# Patient Record
Sex: MALE | Race: BLACK OR AFRICAN AMERICAN | NOT HISPANIC OR LATINO | Employment: OTHER | URBAN - METROPOLITAN AREA
[De-identification: names, ages, dates, MRNs, and addresses within clinical notes are randomized per-mention and may not be internally consistent; named-entity substitution may affect disease eponyms.]

---

## 2017-01-04 ENCOUNTER — HOSPITAL ENCOUNTER (OUTPATIENT)
Dept: RADIOLOGY | Facility: HOSPITAL | Age: 57
Discharge: HOME | End: 2017-01-04
Attending: SPECIALIST
Payer: MEDICARE

## 2017-01-04 VITALS
WEIGHT: 200 LBS | OXYGEN SATURATION: 98 % | BODY MASS INDEX: 28.63 KG/M2 | DIASTOLIC BLOOD PRESSURE: 68 MMHG | HEIGHT: 70 IN | HEART RATE: 68 BPM | RESPIRATION RATE: 16 BRPM | SYSTOLIC BLOOD PRESSURE: 143 MMHG | TEMPERATURE: 99 F

## 2017-01-04 DIAGNOSIS — M54.16 LUMBAR RADICULOPATHY: ICD-10-CM

## 2017-01-04 DIAGNOSIS — M47.26 OSTEOARTHRITIS OF SPINE WITH RADICULOPATHY, LUMBAR REGION: Primary | ICD-10-CM

## 2017-01-04 PROCEDURE — 72131 CT LUMBAR SPINE W/O DYE: CPT | Mod: 26,,, | Performed by: RADIOLOGY

## 2017-01-04 PROCEDURE — 62304 MYELOGRAPHY LUMBAR INJECTION: CPT | Mod: ,,, | Performed by: RADIOLOGY

## 2017-01-04 PROCEDURE — 62304 MYELOGRAPHY LUMBAR INJECTION: CPT | Mod: TC

## 2017-01-04 PROCEDURE — 72131 CT LUMBAR SPINE W/O DYE: CPT | Mod: TC

## 2017-01-04 PROCEDURE — 63600175 PHARM REV CODE 636 W HCPCS

## 2017-01-04 PROCEDURE — 25500020 PHARM REV CODE 255: Performed by: SPECIALIST

## 2017-01-04 RX ORDER — IOPAMIDOL 408 MG/ML
15 INJECTION, SOLUTION INTRATHECAL
Status: COMPLETED | OUTPATIENT
Start: 2017-01-04 | End: 2017-01-04

## 2017-01-04 RX ORDER — IOPAMIDOL 408 MG/ML
INJECTION, SOLUTION INTRATHECAL
Status: DISPENSED
Start: 2017-01-04 | End: 2017-01-04

## 2017-01-04 RX ORDER — MORPHINE SULFATE 10 MG/ML
INJECTION INTRAMUSCULAR; INTRAVENOUS; SUBCUTANEOUS
Status: COMPLETED
Start: 2017-01-04 | End: 2017-01-04

## 2017-01-04 RX ORDER — HYDROCODONE BITARTRATE AND ACETAMINOPHEN 5; 325 MG/1; MG/1
2 TABLET ORAL EVERY 4 HOURS PRN
Status: DISCONTINUED | OUTPATIENT
Start: 2017-01-04 | End: 2017-01-05 | Stop reason: HOSPADM

## 2017-01-04 RX ORDER — MORPHINE SULFATE 10 MG/ML
10 INJECTION INTRAMUSCULAR; INTRAVENOUS; SUBCUTANEOUS ONCE
Status: COMPLETED | OUTPATIENT
Start: 2017-01-04 | End: 2017-01-04

## 2017-01-04 RX ADMIN — IOPAMIDOL 15 ML: 408 INJECTION, SOLUTION INTRATHECAL at 11:01

## 2017-01-04 RX ADMIN — MORPHINE SULFATE 10 MG: 10 INJECTION INTRAMUSCULAR; INTRAVENOUS; SUBCUTANEOUS at 11:01

## 2017-01-04 NOTE — DISCHARGE INSTRUCTIONS
"Discharge Instructions: After Your Surgery/Procedure  Youve just had surgery. During surgery you were given medicine called anesthesia to keep you relaxed and free of pain. After surgery you may have some pain or nausea. This is common. Here are some tips for feeling better and getting well after surgery.     Stay on schedule with your medication.   Going home  Your doctor or nurse will show you how to take care of yourself when you go home. He or she will also answer your questions. Have an adult family member or friend drive you home.      For your safety we recommend these precaution for the first 24 hours after your procedure:  · Do not drive or use heavy equipment.  · Do not make important decisions or sign legal papers.  · Do not drink alcohol.  · Have someone stay with you, if needed. He or she can watch for problems and help keep you safe.  · Your concentration, balance, coordination, and judgement may be impaired for many hours after anesthesia.  Use caution when ambulating or standing up.     · You may feel weak and "washed out" after anesthesia and surgery.      Subtle residual effects of general anesthesia or sedation with regional / local anesthesia can last more than 24 hours.  Rest for the remainder of the day or longer if your Doctor/Surgeon has advised you to do so.  Although you may feel normal within the first 24 hours, your reflexes and mental ability may be impaired without you realizing it.  You may feel dizzy, lightheaded or sleepy for 24 hours or longer.      Be sure to go to all follow-up visits with your doctor. And rest after your surgery for as long as your doctor tells you to.  Coping with pain  If you have pain after surgery, pain medicine will help you feel better. Take it as told, before pain becomes severe. Also, ask your doctor or pharmacist about other ways to control pain. This might be with heat, ice, or relaxation. And follow any other instructions your surgeon or nurse gives " you.  Tips for taking pain medicine  To get the best relief possible, remember these points:  · Pain medicines can upset your stomach. Taking them with a little food may help.  · Most pain relievers taken by mouth need at least 20 to 30 minutes to start to work.  · Taking medicine on a schedule can help you remember to take it. Try to time your medicine so that you can take it before starting an activity. This might be before you get dressed, go for a walk, or sit down for dinner.  · Constipation is a common side effect of pain medicines. Call your doctor before taking any medicines such as laxatives or stool softeners to help ease constipation. Also ask if you should skip any foods. Drinking lots of fluids and eating foods such as fruits and vegetables that are high in fiber can also help. Remember, do not take laxatives unless your surgeon has prescribed them.  · Drinking alcohol and taking pain medicine can cause dizziness and slow your breathing. It can even be deadly. Do not drink alcohol while taking pain medicine.  · Pain medicine can make you react more slowly to things. Do not drive or run machinery while taking pain medicine.  Your health care provider may tell you to take acetaminophen to help ease your pain. Ask him or her how much you are supposed to take each day. Acetaminophen or other pain relievers may interact with your prescription medicines or other over-the-counter (OTC) drugs. Some prescription medicines have acetaminophen and other ingredients. Using both prescription and OTC acetaminophen for pain can cause you to overdose. Read the labels on your OTC medicines with care. This will help you to clearly know the list of ingredients, how much to take, and any warnings. It may also help you not take too much acetaminophen. If you have questions or do not understand the information, ask your pharmacist or health care provider to explain it to you before you take the OTC medicine.  Managing  nausea  Some people have an upset stomach after surgery. This is often because of anesthesia, pain, or pain medicine, or the stress of surgery. These tips will help you handle nausea and eat healthy foods as you get better. If you were on a special food plan before surgery, ask your doctor if you should follow it while you get better. These tips may help:  · Do not push yourself to eat. Your body will tell you when to eat and how much.  · Start off with clear liquids and soup. They are easier to digest.  · Next try semi-solid foods, such as mashed potatoes, applesauce, and gelatin, as you feel ready.  · Slowly move to solid foods. Dont eat fatty, rich, or spicy foods at first.  · Do not force yourself to have 3 large meals a day. Instead eat smaller amounts more often.  · Take pain medicines with a small amount of solid food, such as crackers or toast, to avoid nausea.     Call your surgeon if  · You still have pain an hour after taking medicine. The medicine may not be strong enough.  · You feel too sleepy, dizzy, or groggy. The medicine may be too strong.  · You have side effects like nausea, vomiting, or skin changes, such as rash, itching, or hives.       If you have obstructive sleep apnea  You were given anesthesia medicine during surgery to keep you comfortable and free of pain. After surgery, you may have more apnea spells because of this medicine and other medicines you were given. The spells may last longer than usual.   At home:  · Keep using the continuous positive airway pressure (CPAP) device when you sleep. Unless your health care provider tells you not to, use it when you sleep, day or night. CPAP is a common device used to treat obstructive sleep apnea.  · Talk with your provider before taking any pain medicine, muscle relaxants, or sedatives. Your provider will tell you about the possible dangers of taking these medicines.  © 4757-0413 [employer]. [address] [place] [address]  [place] [address]. All rights reserved. This information is not intended as a substitute for professional medical care. Always follow your healthcare professional's instructions.      Myelogram  A myelogram is a test to check problems with your spinal canal. The canal is a tunnel-like structure in your spine that holds your spinal cord. A myelogram uses X-ray or computed tomography (CT) to take pictures of your spinal canal.  How do I get ready for a myelogram?  · Dont eat the morning of the test. But you can drink water or other clear fluids.  · If told to, stop taking medicines before the test.  · Arrange for someone to drive you home.  Tell the health care provider  Tell the health care provider if you:  · Are pregnant or think you may be  · Have any bleeding problems  · Take blood thinners (anticoagulants) or other medicines. These include aspirin, certain antipsychotic medicines, and antidepressants. You may be told to stop taking these 1 or more days before your test.   · Have had back surgery or low-back pain  · Have any allergies   What happens during a myelogram?     The exam table may be tilted during the X-ray.    · You will change into a hospital gown.  · X-rays of your spine will be taken.  · Your lower back will be cleaned, covered with drapes, and injected with a numbing medicine.  · Your doctor will advance a thin needle under guidance, into your spinal canal space.  · Your doctor will inject contrast fluid into your spinal canal. The doctor may take out a small amount of spinal fluid.  · Additional X-rays will be taken.  · If you need a CT test, it will follow the X-rays.  What happens after a myelogram?  · Take it easy for the rest of the day, as advised.  · Avoid physical activity, or bending over for 1 to 2 days after the procedure, or as directed by your health care provider.  · Lie down with your head raised if you get a headache, or if instructed to do so.  · Drink plenty of water.  · Your provider  will discuss the test results with you at a follow-up appointment.  What are the risks of a myelogram?  · Small risks of pain, bleeding or infection at the injection site or within or around the spinal canal  · Headache  · Injury to a nerve or the spinal cord at the injection site  · X-ray radiation exposure (generally considered to be low risk and safe)  When should I call my health care provider?  Call your health care provider right away if:  · You have a headache that lasts 2 days or more  · Fever (1°F above your normal temperature) lasting for 24 to 48 hours  · You have lasting pain in your back, or tingling in your groin or legs  · Or, whatever your health care provider told you to report based on your medical condition   © 3915-8008 [employer]. [address] [place] [address], [place] [address]. All rights reserved. This information is not intended as a substitute for professional medical care. Always follow your healthcare professional's instructions.

## 2017-01-04 NOTE — PLAN OF CARE
Pt in phase II rec. Has small bandaid on lumbar area on  Back. SO at bedside. Instructed both pt and SO that pt will be here awhile, they can watch TV  To pass time. Ordered sandwich tray for pt. Pt is drinking coke at present nad, states pain is much better since getting pain med downstairs.  1300- pt eating sandwich tray, grateful for lunch  1330- pt urinated without diff. Small bandaid on lower back dry and intact. SO at bedside, explained dc instructions to pt and SO both verb understanding. Wheeled to car via wheelchair by alexa whitman

## 2017-01-04 NOTE — NURSING
Patient had pain level 8/10 prior to procedure, Dr. Walsh notified verbal orders given for morphine sulfate 10 mg IV for one dose for pain control, patient reports decrease in pain after morphine given pain level decreased to 4/10 per patient. AR

## 2017-01-04 NOTE — INTERVAL H&P NOTE
The patient has been examined and the H&P has been reviewed:    I concur with the findings and no changes have occurred since H&P was written.Note is in  from Dr. Flores. No prior problems with anaesthesia but pt on time released morphine at home, has not had for two days. Will give Morphine IV before lumbar myelogram and ct scan.         There are no hospital problems to display for this patient.

## 2017-01-04 NOTE — NURSING
Procedure complete, bandaid to lumbar spine, pt tolerated well with pain controlled with Morphine 10 mg iv once. Report called to Moo SYKES verbalized understanding. AR

## 2017-01-04 NOTE — IP AVS SNAPSHOT
[address] [place] [address]  Phone: [phone]           I have received a copy of my After Visit Summary and discharge instructions from [facility].    INSTRUCTIONS RECEIVED AND UNDERSTOOD BY:                     Patient/Patient Representative: ________________________________________________________________     Date/Time: ________________________________________________________________                     Instructions Given By: ________________________________________________________________     Date/Time: ________________________________________________________________

## 2017-01-04 NOTE — OR NURSING
Pt arrived to preop and placed in bed. Warm blankets provided. Periop process explained to both patient and significant other with verbalized understanding. Pts belongings tagged/bagged and kept in preop.  Pts money, cell phone, and wallet given to Jaci Kee.

## 2017-01-04 NOTE — IP AVS SNAPSHOT
[address] [place] [address]  Phone: [phone]           Patient Discharge Instructions     Our goal is to set you up for success. This packet includes information on your condition, medications, and your home care. It will help you to care for yourself so you don't get sicker and need to go back to the hospital.     Please ask your nurse if you have any questions.        There are many details to remember when preparing to leave the hospital. Here is what you will need to do:    1. Take your medicine. If you are prescribed medications, review your Medication List in the following pages. You may have new medications to  at the pharmacy and others that you'll need to stop taking. Review the instructions for how and when to take your medications. Talk with your doctor or nurses if you are unsure of what to do.     2. Go to your follow-up appointments. Specific follow-up information is listed in the following pages. Your may be contacted by a transition nurse or clinical provider about future appointments. Be sure we have all of the phone numbers to reach you, if needed. Please contact your provider's office if you are unable to make an appointment.     3. Watch for warning signs. Your doctor or nurse will give you detailed warning signs to watch for and when to call for assistance. These instructions may also include educational information about your condition. If you experience any of warning signs to your health, call your doctor.               ** Verify the list of medication(s) below is accurate and up to date. Carry this with you in case of emergency. If your medications have changed, please notify your healthcare provider.             Medication List      TAKE these medications        Additional Info                      hydrocodone-acetaminophen 10-325mg  mg Tab   Commonly known as:  NORCO   Refills:  0    Instructions:  Take by mouth as needed  for Pain.     Begin Date    AM    Noon    PM    Bedtime       morphine 30 MG 24 hr capsule   Commonly known as:  AVINZA   Refills:  0   Dose:  30 mg    Instructions:  Take 30 mg by mouth once daily.     Begin Date    AM    Noon    PM    Bedtime       NAPROSYN ORAL   Refills:  0    Instructions:  Take by mouth.     Begin Date    AM    Noon    PM    Bedtime       SOMA ORAL   Refills:  0    Instructions:  Take by mouth as needed.     Begin Date    AM    Noon    PM    Bedtime       tramadol 50 mg tablet   Commonly known as:  ULTRAM   Refills:  0   Dose:  50 mg    Instructions:  Take 50 mg by mouth every 6 (six) hours as needed for Pain.     Begin Date    AM    Noon    PM    Bedtime                  Please bring to all follow up appointments:    1. A copy of your discharge instructions.  2. All medicines you are currently taking in their original bottles.  3. Identification and insurance card.    Please arrive 15 minutes ahead of scheduled appointment time.    Please call 24 hours in advance if you must reschedule your appointment and/or time.            Discharge Instructions       Discharge Instructions: After Your Surgery/Procedure  Youve just had surgery. During surgery you were given medicine called anesthesia to keep you relaxed and free of pain. After surgery you may have some pain or nausea. This is common. Here are some tips for feeling better and getting well after surgery.     Stay on schedule with your medication.   Going home  Your doctor or nurse will show you how to take care of yourself when you go home. He or she will also answer your questions. Have an adult family member or friend drive you home.      For your safety we recommend these precaution for the first 24 hours after your procedure:  · Do not drive or use heavy equipment.  · Do not make important decisions or sign legal papers.  · Do not drink alcohol.  · Have someone stay with you, if needed. He or she can watch for problems and help keep you  "safe.  · Your concentration, balance, coordination, and judgement may be impaired for many hours after anesthesia.  Use caution when ambulating or standing up.     · You may feel weak and "washed out" after anesthesia and surgery.      Subtle residual effects of general anesthesia or sedation with regional / local anesthesia can last more than 24 hours.  Rest for the remainder of the day or longer if your Doctor/Surgeon has advised you to do so.  Although you may feel normal within the first 24 hours, your reflexes and mental ability may be impaired without you realizing it.  You may feel dizzy, lightheaded or sleepy for 24 hours or longer.      Be sure to go to all follow-up visits with your doctor. And rest after your surgery for as long as your doctor tells you to.  Coping with pain  If you have pain after surgery, pain medicine will help you feel better. Take it as told, before pain becomes severe. Also, ask your doctor or pharmacist about other ways to control pain. This might be with heat, ice, or relaxation. And follow any other instructions your surgeon or nurse gives you.  Tips for taking pain medicine  To get the best relief possible, remember these points:  · Pain medicines can upset your stomach. Taking them with a little food may help.  · Most pain relievers taken by mouth need at least 20 to 30 minutes to start to work.  · Taking medicine on a schedule can help you remember to take it. Try to time your medicine so that you can take it before starting an activity. This might be before you get dressed, go for a walk, or sit down for dinner.  · Constipation is a common side effect of pain medicines. Call your doctor before taking any medicines such as laxatives or stool softeners to help ease constipation. Also ask if you should skip any foods. Drinking lots of fluids and eating foods such as fruits and vegetables that are high in fiber can also help. Remember, do not take laxatives unless your surgeon " has prescribed them.  · Drinking alcohol and taking pain medicine can cause dizziness and slow your breathing. It can even be deadly. Do not drink alcohol while taking pain medicine.  · Pain medicine can make you react more slowly to things. Do not drive or run machinery while taking pain medicine.  Your health care provider may tell you to take acetaminophen to help ease your pain. Ask him or her how much you are supposed to take each day. Acetaminophen or other pain relievers may interact with your prescription medicines or other over-the-counter (OTC) drugs. Some prescription medicines have acetaminophen and other ingredients. Using both prescription and OTC acetaminophen for pain can cause you to overdose. Read the labels on your OTC medicines with care. This will help you to clearly know the list of ingredients, how much to take, and any warnings. It may also help you not take too much acetaminophen. If you have questions or do not understand the information, ask your pharmacist or health care provider to explain it to you before you take the OTC medicine.  Managing nausea  Some people have an upset stomach after surgery. This is often because of anesthesia, pain, or pain medicine, or the stress of surgery. These tips will help you handle nausea and eat healthy foods as you get better. If you were on a special food plan before surgery, ask your doctor if you should follow it while you get better. These tips may help:  · Do not push yourself to eat. Your body will tell you when to eat and how much.  · Start off with clear liquids and soup. They are easier to digest.  · Next try semi-solid foods, such as mashed potatoes, applesauce, and gelatin, as you feel ready.  · Slowly move to solid foods. Dont eat fatty, rich, or spicy foods at first.  · Do not force yourself to have 3 large meals a day. Instead eat smaller amounts more often.  · Take pain medicines with a small amount of solid food, such as crackers or  toast, to avoid nausea.     Call your surgeon if  · You still have pain an hour after taking medicine. The medicine may not be strong enough.  · You feel too sleepy, dizzy, or groggy. The medicine may be too strong.  · You have side effects like nausea, vomiting, or skin changes, such as rash, itching, or hives.       If you have obstructive sleep apnea  You were given anesthesia medicine during surgery to keep you comfortable and free of pain. After surgery, you may have more apnea spells because of this medicine and other medicines you were given. The spells may last longer than usual.   At home:  · Keep using the continuous positive airway pressure (CPAP) device when you sleep. Unless your health care provider tells you not to, use it when you sleep, day or night. CPAP is a common device used to treat obstructive sleep apnea.  · Talk with your provider before taking any pain medicine, muscle relaxants, or sedatives. Your provider will tell you about the possible dangers of taking these medicines.  © 5617-5754 [employer]. [address] [place] [address]. All rights reserved. This information is not intended as a substitute for professional medical care. Always follow your healthcare professional's instructions.      Myelogram  A myelogram is a test to check problems with your spinal canal. The canal is a tunnel-like structure in your spine that holds your spinal cord. A myelogram uses X-ray or computed tomography (CT) to take pictures of your spinal canal.  How do I get ready for a myelogram?  · Dont eat the morning of the test. But you can drink water or other clear fluids.  · If told to, stop taking medicines before the test.  · Arrange for someone to drive you home.  Tell the health care provider  Tell the health care provider if you:  · Are pregnant or think you may be  · Have any bleeding problems  · Take blood thinners (anticoagulants) or other medicines. These include aspirin,  certain antipsychotic medicines, and antidepressants. You may be told to stop taking these 1 or more days before your test.   · Have had back surgery or low-back pain  · Have any allergies   What happens during a myelogram?     The exam table may be tilted during the X-ray.    · You will change into a hospital gown.  · X-rays of your spine will be taken.  · Your lower back will be cleaned, covered with drapes, and injected with a numbing medicine.  · Your doctor will advance a thin needle under guidance, into your spinal canal space.  · Your doctor will inject contrast fluid into your spinal canal. The doctor may take out a small amount of spinal fluid.  · Additional X-rays will be taken.  · If you need a CT test, it will follow the X-rays.  What happens after a myelogram?  · Take it easy for the rest of the day, as advised.  · Avoid physical activity, or bending over for 1 to 2 days after the procedure, or as directed by your health care provider.  · Lie down with your head raised if you get a headache, or if instructed to do so.  · Drink plenty of water.  · Your provider will discuss the test results with you at a follow-up appointment.  What are the risks of a myelogram?  · Small risks of pain, bleeding or infection at the injection site or within or around the spinal canal  · Headache  · Injury to a nerve or the spinal cord at the injection site  · X-ray radiation exposure (generally considered to be low risk and safe)  When should I call my health care provider?  Call your health care provider right away if:  · You have a headache that lasts 2 days or more  · Fever (1°F above your normal temperature) lasting for 24 to 48 hours  · You have lasting pain in your back, or tingling in your groin or legs  · Or, whatever your health care provider told you to report based on your medical condition   © 2561-1524 [employer]. [address] [place] [address], [place] [address]. All rights reserved. This information  "is not intended as a substitute for professional medical care. Always follow your healthcare professional's instructions.            Admission Information     Date & Time Provider Department CSN    1/4/2017 11:00 AM Geovany Flores Jr., MD [facility] [id number]      Care Providers     Provider Role Specialty Primary office phone    Geovany Flores Jr., MD Attending Provider Orthopedic Surgery [phone]      Your Vitals Were     BP Pulse Temp Resp Height Weight    144/94 67 98.3 °F (36.8 °C) 18 5' 10" (1.778 m) 90.7 kg (200 lb)    SpO2 BMI             99% 28.7 kg/m2         Recent Lab Values     No lab values to display.      Allergies as of 1/4/2017     No Known Allergies      [facility]     [facility] Nurse Care Line - 24/7 Assistance  Unless otherwise directed by your provider, please contact [facility], our nurse care line that is available for 24/7 assistance.     Registered nurses in the [facility] provide clinical advisement, health education, appointment booking, and other advisory services.  Call for this free service at [phone].        Advance Directives     An advance directive is a document which, in the event you are no longer able to make decisions for yourself, tells your healthcare team what kind of treatment you do or do not want to receive, or who you would like to make those decisions for you.  If you do not currently have an advance directive, [facility] encourages you to create one.  For more information call:  [phone]), [phone] ([phone]),  or log on to [email or web address].        Language Assistance Services     ATTENTION: Language assistance services are available, free of charge. Please call [phone].      ATENCIÓN: Si habla español, tiene a alberto disposición servicios gratuitos de asistencia lingüística. Llame al [phone].     CHÚ Ý: N?u b?n nói Ti?ng Vi?t, có các d?ch v? h? tr? ngôn ng? mi?n " adeel aminah cho b?n. G?i s? [phone].        [facility] Sign-Up     Activating your [facility] account is as easy as 1-2-3!     1) Visit [email or web address], select Sign Up Now, enter this activation code and your date of birth, then select Next.  [email or web address]  Expires: 2/11/2017  3:52 PM      2) Create a username and password to use when you visit [facility] in the future and select a security question in case you lose your password and select Next.    3) Enter your e-mail address and click Sign Up!    Additional Information  If you have questions, please e-mail [email or web address] or call [phone] to talk to our [facility] staff. Remember, [facility] is NOT to be used for urgent needs. For medical emergencies, dial 911.          [facility] complies with applicable Federal civil rights laws and does not discriminate on the basis of race, color, national origin, age, disability, or sex.

## 2017-07-07 PROBLEM — Z98.1 STATUS POST CERVICAL SPINAL FUSION: Status: ACTIVE | Noted: 2017-07-07

## 2017-09-14 ENCOUNTER — ANESTHESIA EVENT (OUTPATIENT)
Dept: SURGERY | Facility: AMBULARY SURGERY CENTER | Age: 57
End: 2017-09-14
Payer: MEDICARE

## 2017-09-15 ENCOUNTER — HOSPITAL ENCOUNTER (OUTPATIENT)
Facility: AMBULARY SURGERY CENTER | Age: 57
Discharge: HOME | End: 2017-09-15
Attending: SPECIALIST | Admitting: SPECIALIST
Payer: MEDICARE

## 2017-09-15 ENCOUNTER — ANESTHESIA (OUTPATIENT)
Dept: SURGERY | Facility: AMBULARY SURGERY CENTER | Age: 57
End: 2017-09-15
Payer: MEDICARE

## 2017-09-15 ENCOUNTER — SURGERY (OUTPATIENT)
Age: 57
End: 2017-09-15

## 2017-09-15 DIAGNOSIS — M51.36 DDD (DEGENERATIVE DISC DISEASE), LUMBAR: ICD-10-CM

## 2017-09-15 DIAGNOSIS — M47.816 LUMBAR SPONDYLOSIS: Primary | ICD-10-CM

## 2017-09-15 DIAGNOSIS — M54.16 LUMBAR RADICULOPATHY: ICD-10-CM

## 2017-09-15 DIAGNOSIS — M48.061 NEUROFORAMINAL STENOSIS OF LUMBAR SPINE: ICD-10-CM

## 2017-09-15 DIAGNOSIS — Z98.890 HISTORY OF LUMBAR LAMINECTOMY FOR SPINAL CORD DECOMPRESSION: ICD-10-CM

## 2017-09-15 PROCEDURE — G8907 PT DOC NO EVENTS ON DISCHARG: HCPCS | Performed by: SPECIALIST

## 2017-09-15 PROCEDURE — G8918 PT W/O PREOP ORDER IV AB PRO: HCPCS | Performed by: SPECIALIST

## 2017-09-15 PROCEDURE — D9220A PRA ANESTHESIA: Mod: ANES,,, | Performed by: ANESTHESIOLOGY

## 2017-09-15 PROCEDURE — 62323 NJX INTERLAMINAR LMBR/SAC: CPT | Performed by: SPECIALIST

## 2017-09-15 PROCEDURE — D9220A PRA ANESTHESIA: Mod: CRNA,,, | Performed by: NURSE ANESTHETIST, CERTIFIED REGISTERED

## 2017-09-15 RX ORDER — LIDOCAINE HYDROCHLORIDE 10 MG/ML
1 INJECTION, SOLUTION EPIDURAL; INFILTRATION; INTRACAUDAL; PERINEURAL ONCE
Status: DISCONTINUED | OUTPATIENT
Start: 2017-09-15 | End: 2017-09-15 | Stop reason: HOSPADM

## 2017-09-15 RX ORDER — SODIUM CHLORIDE, SODIUM LACTATE, POTASSIUM CHLORIDE, CALCIUM CHLORIDE 600; 310; 30; 20 MG/100ML; MG/100ML; MG/100ML; MG/100ML
INJECTION, SOLUTION INTRAVENOUS CONTINUOUS
Status: DISCONTINUED | OUTPATIENT
Start: 2017-09-15 | End: 2017-09-15 | Stop reason: HOSPADM

## 2017-09-15 RX ORDER — LIDOCAINE HYDROCHLORIDE 20 MG/ML
INJECTION, SOLUTION EPIDURAL; INFILTRATION; INTRACAUDAL; PERINEURAL
Status: DISCONTINUED
Start: 2017-09-15 | End: 2017-09-15 | Stop reason: HOSPADM

## 2017-09-15 RX ORDER — PROPOFOL 10 MG/ML
VIAL (ML) INTRAVENOUS
Status: DISCONTINUED | OUTPATIENT
Start: 2017-09-15 | End: 2017-09-15

## 2017-09-15 RX ORDER — DEXAMETHASONE SODIUM PHOSPHATE 100 MG/10ML
INJECTION INTRAMUSCULAR; INTRAVENOUS
Status: DISCONTINUED | OUTPATIENT
Start: 2017-09-15 | End: 2017-09-15 | Stop reason: HOSPADM

## 2017-09-15 RX ORDER — LIDOCAINE HCL/PF 100 MG/5ML
SYRINGE (ML) INTRAVENOUS
Status: DISCONTINUED | OUTPATIENT
Start: 2017-09-15 | End: 2017-09-15

## 2017-09-15 RX ORDER — SODIUM CHLORIDE 9 MG/ML
INJECTION, SOLUTION INTRAMUSCULAR; INTRAVENOUS; SUBCUTANEOUS
Status: DISCONTINUED | OUTPATIENT
Start: 2017-09-15 | End: 2017-09-15 | Stop reason: HOSPADM

## 2017-09-15 RX ORDER — BUPIVACAINE HYDROCHLORIDE 2.5 MG/ML
INJECTION, SOLUTION EPIDURAL; INFILTRATION; INTRACAUDAL
Status: DISCONTINUED | OUTPATIENT
Start: 2017-09-15 | End: 2017-09-15 | Stop reason: HOSPADM

## 2017-09-15 RX ORDER — SODIUM CHLORIDE 0.9 % (FLUSH) 0.9 %
3 SYRINGE (ML) INJECTION
Status: DISCONTINUED | OUTPATIENT
Start: 2017-09-15 | End: 2017-09-15 | Stop reason: HOSPADM

## 2017-09-15 RX ORDER — PROPOFOL 10 MG/ML
INJECTION, EMULSION INTRAVENOUS
Status: DISCONTINUED
Start: 2017-09-15 | End: 2017-09-15 | Stop reason: HOSPADM

## 2017-09-15 RX ORDER — BUPIVACAINE HYDROCHLORIDE 2.5 MG/ML
INJECTION, SOLUTION EPIDURAL; INFILTRATION; INTRACAUDAL
Status: DISPENSED
Start: 2017-09-15 | End: 2017-09-16

## 2017-09-15 RX ORDER — DEXAMETHASONE SODIUM PHOSPHATE 10 MG/ML
INJECTION INTRAMUSCULAR; INTRAVENOUS
Status: DISPENSED
Start: 2017-09-15 | End: 2017-09-16

## 2017-09-15 RX ADMIN — Medication 50 MG: at 11:09

## 2017-09-15 RX ADMIN — BUPIVACAINE HYDROCHLORIDE 4.5 ML: 2.5 INJECTION, SOLUTION EPIDURAL; INFILTRATION; INTRACAUDAL at 11:09

## 2017-09-15 RX ADMIN — SODIUM CHLORIDE 4.5 ML: 9 INJECTION, SOLUTION INTRAMUSCULAR; INTRAVENOUS; SUBCUTANEOUS at 11:09

## 2017-09-15 RX ADMIN — DEXAMETHASONE SODIUM PHOSPHATE 20 MG: 100 INJECTION INTRAMUSCULAR; INTRAVENOUS at 11:09

## 2017-09-15 RX ADMIN — SODIUM CHLORIDE, SODIUM LACTATE, POTASSIUM CHLORIDE, CALCIUM CHLORIDE: 600; 310; 30; 20 INJECTION, SOLUTION INTRAVENOUS at 10:09

## 2017-09-15 RX ADMIN — BUPIVACAINE HYDROCHLORIDE 5 ML: 2.5 INJECTION, SOLUTION EPIDURAL; INFILTRATION; INTRACAUDAL at 11:09

## 2017-09-15 RX ADMIN — SODIUM CHLORIDE, SODIUM LACTATE, POTASSIUM CHLORIDE, CALCIUM CHLORIDE: 600; 310; 30; 20 INJECTION, SOLUTION INTRAVENOUS at 11:09

## 2017-09-15 NOTE — DISCHARGE SUMMARY
[facility]  Discharge Note  Short Stay    Admit Date: 9/15/2017    Discharge Date and Time: No discharge date for patient encounter.     Attending Physician: Geovany Flores Jr., MD     Discharge Provider: Geovany Flores Jr    Diagnoses:  Active Hospital Problems    Diagnosis  POA    *Lumbar radiculopathy [M54.16]  Yes      Resolved Hospital Problems    Diagnosis Date Resolved POA   No resolved problems to display.       Discharged Condition: good    Hospital Course: Patient was admitted for an outpatient procedure and tolerated the procedure well with no complications.    Final Diagnoses: Same as principal problem.    Disposition: Home or Self Care    Follow up/Patient Instructions:    Medications:  Reconciled Home Medications:   Current Discharge Medication List      CONTINUE these medications which have NOT CHANGED    Details   ERGOCALCIFEROL, VITAMIN D2, (VITAMIN D ORAL) Take by mouth.      CARISOPRODOL (SOMA ORAL) Take by mouth as needed.       hydrocodone-acetaminophen 10-325mg (NORCO)  mg Tab Take by mouth as needed for Pain.      morphine (AVINZA) 30 MG 24 hr capsule Take 30 mg by mouth once daily.      NAPROXEN (NAPROSYN ORAL) Take by mouth.      tramadol (ULTRAM) 50 mg tablet Take 50 mg by mouth every 6 (six) hours as needed for Pain.           No discharge procedures on file.  Follow-up Information     Geovany Flores Jr, MD In 3 weeks.    Specialty:  Orthopedic Surgery  Contact information:  [address]  [address]  [place] [id number] [phone]                   Discharge Procedure Orders (must include Diet, Follow-up, Activity):  No discharge procedures on file.

## 2017-09-15 NOTE — ANESTHESIA POSTPROCEDURE EVALUATION
"Anesthesia Post Evaluation    Patient: Varun Miller Jr.    Procedure(s) Performed: Procedure(s) (LRB):  INJECTION-STEROID-EPIDURAL-LUMBAR (N/A)    Final Anesthesia Type: MAC  Patient location during evaluation: PACU  Patient participation: Yes- Able to Participate  Level of consciousness: awake and alert and oriented  Post-procedure vital signs: reviewed and stable  Pain management: adequate  Airway patency: patent  PONV status at discharge: No PONV  Anesthetic complications: no      Cardiovascular status: hemodynamically stable  Respiratory status: unassisted, spontaneous ventilation and room air  Hydration status: euvolemic  Follow-up not needed.        Visit Vitals  /83 (BP Location: Right arm, Patient Position: Lying)   Pulse 60   Temp 36.7 °C (98.1 °F) (Oral)   Resp 18   Ht 5' 10" (1.778 m)   Wt 90.7 kg (200 lb)   SpO2 100%   BMI 28.70 kg/m²       Pain/Temo Score: Pain Assessment Performed: Yes (9/15/2017 10:19 AM)  Presence of Pain: complains of pain/discomfort (9/15/2017 10:19 AM)  Temo Score: 10 (9/15/2017 11:30 AM)      "

## 2017-09-15 NOTE — TRANSFER OF CARE
"Anesthesia Transfer of Care Note    Patient: Varun Miller Jr.    Procedure(s) Performed: Procedure(s) (LRB):  INJECTION-STEROID-EPIDURAL-LUMBAR (N/A)    Patient location: PACU    Anesthesia Type: MAC    Transport from OR: Transported from OR on 2-3 L/min O2 by NC with adequate spontaneous ventilation    Post pain: adequate analgesia    Post assessment: no apparent anesthetic complications and tolerated procedure well    Post vital signs: stable    Level of consciousness: awake, alert and oriented    Nausea/Vomiting: no nausea/vomiting    Complications: none    Transfer of care protocol was followed      Last vitals:   Visit Vitals  /83 (BP Location: Right arm, Patient Position: Lying)   Pulse 60   Temp 36.6 °C (97.9 °F) (Oral)   Resp 20   Ht 5' 10" (1.778 m)   Wt 90.7 kg (200 lb)   SpO2 100%   BMI 28.70 kg/m²     "

## 2017-09-15 NOTE — ANESTHESIA PREPROCEDURE EVALUATION
09/15/2017  Varun Miller Jr. is a 57 y.o., male.    Pre-op Assessment    I have reviewed the Patient Summary Reports.     I have reviewed the Nursing Notes.   I have reviewed the Medications.     Review of Systems  Anesthesia Hx:  No problems with previous Anesthesia    Social:  Non-Smoker, Alcohol Use    Cardiovascular:  Cardiovascular Normal     Pulmonary:  Pulmonary Normal    Hepatic/GI:  Hepatic/GI Normal    Musculoskeletal:   Arthritis   Spine Disorders:    Neurological:   Chronic Pain Syndrome  Peripheral Neuropathy    Endocrine:  Endocrine Normal        Physical Exam  General:  Well nourished    Airway/Jaw/Neck:  AIRWAY FINDINGS: Normal      Chest/Lungs:  Chest/Lungs Clear    Heart/Vascular:  Heart Findings: Normal       Mental Status:  Mental Status Findings: Normal        Anesthesia Plan  Type of Anesthesia, risks & benefits discussed:  Anesthesia Type:  MAC  Patient's Preference:   Intra-op Monitoring Plan: standard ASA monitors  Intra-op Monitoring Plan Comments:   Post Op Pain Control Plan:   Post Op Pain Control Plan Comments:   Induction:   IV  Beta Blocker:  Patient is not currently on a Beta-Blocker (No further documentation required).       Informed Consent: Patient understands risks and agrees with Anesthesia plan.  Questions answered. Anesthesia consent signed with patient.  ASA Score: 2     Day of Surgery Review of History & Physical: I have interviewed and examined the patient. I have reviewed the patient's H&P dated:  There are no significant changes.  H&P update referred to the surgeon.         Ready For Surgery From Anesthesia Perspective.

## 2017-09-15 NOTE — PLAN OF CARE
1155:Patient awake, alert, and oriented.  No complaints of pain or discomfort at present time. nontender; VSS, tolerating fluids without C/O N/V. Stable for DISCHARGE.

## 2017-09-15 NOTE — OP NOTE
DATE OF PROCEDURE:  9/15/2017     PREOPERATIVE DIAGNOSIS  Lumbar Radiculopathy    POSTOPERATIVE DIAGNOSIS  Lumbar Radiculopathy    PROCEDURE  Caudal sacrolumbar epidural steroid Marcaine injection  Intraoperative fluoroscopy  Epidurogram      SURGEON  Geovany Flores MD    ANESTHESIA  Monitored anesthesia care    ESTIMATED BLOOD LOSS  Less than 1 mL    INDICATION FOR SURGERY    Patient presents with complaints of back and associated leg radicular pain. The patients pain continues to persist, interfering in the patients quality of life and activities of daily living. Surgical procedure planned with steroid and Marcaine injection into the sacrolumbar epidural region in an attempt to improve leg radicular pain. The patient was informed that the surgical procedure provides no guarantee of improvement or worsening of present condition, but only an attempt to improve overall condition and function. The patient expressed understanding, and all questions were answered and still desired to proceed with operative procedure.      PROCEDURE IN DETAIL    The patient was taken to the operating room, and placed on operating table in prone position. All bony prominences were well padded and protected. Anesthesia achieved with monitored anesthesia care.  After adequate anesthesia was achieved, the lower back and sacral region was prepped and draped in the usual sterile fashion.  Approximately 3 ml of 0.25% Marcaine was utilized locally within skin and subcutaneous tissue. With the assistance of intraoperative fluoroscopy, an 18-gauge spinal needle was introduced and advanced into the epidural space via the sacral hiatus.  Confirmation was obtained with Omnipaque placed into the epidural space.  Mixture of approximately 4.5ml of 0.25% Marcaine preservative free, 4.5 ml of normal saline preservative free, and 2ml of 20mg Decadron was made and advanced into the epidural space via the sacral hiatus. At the completion of epidural  injection, the needle was slowly withdrawn. The surgical site was cleaned with normal saline with application of sterile occlusive Band-Aid. Anesthesia was reversed and the patient was transferred to recovery room in stable condition without immediate apparent surgical complications.

## 2017-09-18 VITALS
RESPIRATION RATE: 18 BRPM | SYSTOLIC BLOOD PRESSURE: 156 MMHG | HEIGHT: 70 IN | WEIGHT: 200 LBS | TEMPERATURE: 98 F | BODY MASS INDEX: 28.63 KG/M2 | OXYGEN SATURATION: 99 % | HEART RATE: 60 BPM | DIASTOLIC BLOOD PRESSURE: 84 MMHG

## 2017-09-18 RX ORDER — ONDANSETRON 2 MG/ML
4 INJECTION INTRAMUSCULAR; INTRAVENOUS DAILY PRN
Status: ACTIVE | OUTPATIENT
Start: 2017-09-18

## 2017-09-18 RX ORDER — OXYCODONE AND ACETAMINOPHEN 5; 325 MG/1; MG/1
1 TABLET ORAL
Status: ACTIVE | OUTPATIENT
Start: 2017-09-18

## 2017-09-18 RX ORDER — SODIUM CHLORIDE, SODIUM LACTATE, POTASSIUM CHLORIDE, CALCIUM CHLORIDE 600; 310; 30; 20 MG/100ML; MG/100ML; MG/100ML; MG/100ML
125 INJECTION, SOLUTION INTRAVENOUS CONTINUOUS
Status: ACTIVE | OUTPATIENT
Start: 2017-09-18

## 2017-09-21 ENCOUNTER — HOSPITAL ENCOUNTER (OUTPATIENT)
Dept: RADIOLOGY | Facility: HOSPITAL | Age: 57
Discharge: HOME | End: 2017-09-21
Attending: SPECIALIST
Payer: MEDICARE

## 2017-09-21 DIAGNOSIS — I82.402 SUSPECTED DVT (DEEP VEIN THROMBOSIS), LEFT: ICD-10-CM

## 2017-09-21 DIAGNOSIS — R60.9 EDEMA, UNSPECIFIED TYPE: ICD-10-CM

## 2017-09-21 PROBLEM — R09.89 SUSPECTED DVT (DEEP VEIN THROMBOSIS): Status: ACTIVE | Noted: 2017-09-21

## 2017-09-21 PROCEDURE — 93971 EXTREMITY STUDY: CPT | Mod: 26,,, | Performed by: RADIOLOGY

## 2017-09-21 PROCEDURE — 93971 EXTREMITY STUDY: CPT | Mod: TC

## 2017-12-09 ENCOUNTER — HOSPITAL ENCOUNTER (EMERGENCY)
Facility: OTHER | Age: 57
Discharge: HOME | End: 2017-12-09
Attending: EMERGENCY MEDICINE
Payer: MEDICARE

## 2017-12-09 VITALS
OXYGEN SATURATION: 100 % | DIASTOLIC BLOOD PRESSURE: 72 MMHG | WEIGHT: 198 LBS | HEIGHT: 70 IN | TEMPERATURE: 99 F | RESPIRATION RATE: 16 BRPM | SYSTOLIC BLOOD PRESSURE: 128 MMHG | HEART RATE: 89 BPM | BODY MASS INDEX: 28.35 KG/M2

## 2017-12-09 DIAGNOSIS — R11.2 NAUSEA VOMITING AND DIARRHEA: Primary | ICD-10-CM

## 2017-12-09 DIAGNOSIS — D64.9 ANEMIA, UNSPECIFIED TYPE: ICD-10-CM

## 2017-12-09 DIAGNOSIS — R19.7 NAUSEA VOMITING AND DIARRHEA: Primary | ICD-10-CM

## 2017-12-09 LAB
ALBUMIN SERPL BCP-MCNC: 3.8 G/DL
ALP SERPL-CCNC: 90 U/L
ALT SERPL W/O P-5'-P-CCNC: 12 U/L
ANION GAP SERPL CALC-SCNC: 11 MMOL/L
AST SERPL-CCNC: 14 U/L
BASOPHILS # BLD AUTO: 0 K/UL
BASOPHILS NFR BLD: 0 %
BILIRUB SERPL-MCNC: 0.3 MG/DL
BUN SERPL-MCNC: 13 MG/DL
CALCIUM SERPL-MCNC: 9.3 MG/DL
CHLORIDE SERPL-SCNC: 106 MMOL/L
CO2 SERPL-SCNC: 26 MMOL/L
CREAT SERPL-MCNC: 1.1 MG/DL
DIFFERENTIAL METHOD: ABNORMAL
EOSINOPHIL # BLD AUTO: 0 K/UL
EOSINOPHIL NFR BLD: 0 %
ERYTHROCYTE [DISTWIDTH] IN BLOOD BY AUTOMATED COUNT: 15.4 %
EST. GFR  (AFRICAN AMERICAN): >60 ML/MIN/1.73 M^2
EST. GFR  (NON AFRICAN AMERICAN): >60 ML/MIN/1.73 M^2
GLUCOSE SERPL-MCNC: 145 MG/DL
HCT VFR BLD AUTO: 38.4 %
HGB BLD-MCNC: 13 G/DL
LIPASE SERPL-CCNC: 24 U/L
LYMPHOCYTES # BLD AUTO: 0.8 K/UL
LYMPHOCYTES NFR BLD: 8.4 %
MAGNESIUM SERPL-MCNC: 1.9 MG/DL
MCH RBC QN AUTO: 30.5 PG
MCHC RBC AUTO-ENTMCNC: 33.9 G/DL
MCV RBC AUTO: 90 FL
MONOCYTES # BLD AUTO: 0.3 K/UL
MONOCYTES NFR BLD: 2.7 %
NEUTROPHILS # BLD AUTO: 8.8 K/UL
NEUTROPHILS NFR BLD: 88.6 %
PLATELET # BLD AUTO: 267 K/UL
PMV BLD AUTO: 10.7 FL
POTASSIUM SERPL-SCNC: 4.2 MMOL/L
PROT SERPL-MCNC: 7.7 G/DL
RBC # BLD AUTO: 4.26 M/UL
SODIUM SERPL-SCNC: 143 MMOL/L
WBC # BLD AUTO: 9.91 K/UL

## 2017-12-09 PROCEDURE — 99284 EMERGENCY DEPT VISIT MOD MDM: CPT | Mod: 25

## 2017-12-09 PROCEDURE — 96361 HYDRATE IV INFUSION ADD-ON: CPT

## 2017-12-09 PROCEDURE — 96375 TX/PRO/DX INJ NEW DRUG ADDON: CPT

## 2017-12-09 PROCEDURE — 80053 COMPREHEN METABOLIC PANEL: CPT

## 2017-12-09 PROCEDURE — 83735 ASSAY OF MAGNESIUM: CPT

## 2017-12-09 PROCEDURE — 96376 TX/PRO/DX INJ SAME DRUG ADON: CPT

## 2017-12-09 PROCEDURE — 25000003 PHARM REV CODE 250: Performed by: EMERGENCY MEDICINE

## 2017-12-09 PROCEDURE — 63600175 PHARM REV CODE 636 W HCPCS: Performed by: EMERGENCY MEDICINE

## 2017-12-09 PROCEDURE — 96374 THER/PROPH/DIAG INJ IV PUSH: CPT

## 2017-12-09 PROCEDURE — 83690 ASSAY OF LIPASE: CPT

## 2017-12-09 PROCEDURE — 85025 COMPLETE CBC W/AUTO DIFF WBC: CPT

## 2017-12-09 RX ORDER — ONDANSETRON 2 MG/ML
4 INJECTION INTRAMUSCULAR; INTRAVENOUS
Status: COMPLETED | OUTPATIENT
Start: 2017-12-09 | End: 2017-12-09

## 2017-12-09 RX ORDER — ONDANSETRON 4 MG/1
4 TABLET, ORALLY DISINTEGRATING ORAL EVERY 8 HOURS PRN
Qty: 12 TABLET | Refills: 0 | Status: SHIPPED | OUTPATIENT
Start: 2017-12-09

## 2017-12-09 RX ORDER — MORPHINE SULFATE 2 MG/ML
2 INJECTION, SOLUTION INTRAMUSCULAR; INTRAVENOUS
Status: COMPLETED | OUTPATIENT
Start: 2017-12-09 | End: 2017-12-09

## 2017-12-09 RX ADMIN — ONDANSETRON 4 MG: 2 INJECTION, SOLUTION INTRAMUSCULAR; INTRAVENOUS at 08:12

## 2017-12-09 RX ADMIN — ONDANSETRON 4 MG: 2 INJECTION, SOLUTION INTRAMUSCULAR; INTRAVENOUS at 07:12

## 2017-12-09 RX ADMIN — SODIUM CHLORIDE 1000 ML: 0.9 INJECTION, SOLUTION INTRAVENOUS at 07:12

## 2017-12-09 RX ADMIN — MORPHINE SULFATE 2 MG: 2 INJECTION, SOLUTION INTRAMUSCULAR; INTRAVENOUS at 08:12

## 2017-12-09 NOTE — ED NOTES
Patient resting comfortably on cot states pain 0/10 with wife at bedside lights turned down for patient comfort.    Report given to MONY Wolff.

## 2017-12-09 NOTE — ED PROVIDER NOTES
"Encounter Date: 12/9/2017    SCRIBE #1 NOTE: I, Nico Carranza, am scribing for, and in the presence of, Dr. Emanuel.       History     Chief Complaint   Patient presents with    N/V/D     since last night at about 9 pm, took 2 pepto tablets without relief     7:56 AM    Patient is a 57 y.o. male who presents to the ED with complaint of nausea, vomiting, and diarrhea which began last night two hours after eating fried oysters, approximately 13 hours ago. He reports associated headache and abdominal pain. Abdominal pain is described as "sharp" and is rated 10/10. He notes that he regularly goes to that restaurant and that his wife also ate oysters last night but had her own separate plate. He reports no alleviating factors. He reports no past abdominal surgeries, but notes a past spine surgery. He reports no known allergies. He reports occasional use of alcohol, but denies use of tobacco or illicit drugs.      The history is provided by the patient and the spouse.     Review of patient's allergies indicates:  No Known Allergies  Past Medical History:   Diagnosis Date    Back pain     Cervical radiculopathy     DDD (degenerative disc disease), cervical     Eye globe prosthesis     left    Glaucoma     Neck pain      Past Surgical History:   Procedure Laterality Date    BACK SURGERY      NECK SURGERY       Family History   Problem Relation Age of Onset    Diabetes Mother      Social History   Substance Use Topics    Smoking status: Never Smoker    Smokeless tobacco: Never Used    Alcohol use Yes      Comment: occassion      Review of Systems   Constitutional: Negative for chills and fever.   HENT: Negative for congestion and sore throat.    Eyes: Negative for photophobia and redness.   Respiratory: Negative for cough and shortness of breath.    Cardiovascular: Negative for chest pain.   Gastrointestinal: Positive for abdominal pain, diarrhea, nausea and vomiting.   Genitourinary: Negative for dysuria. "   Musculoskeletal: Negative for back pain.   Skin: Negative for rash.   Neurological: Positive for headaches. Negative for weakness and light-headedness.   Psychiatric/Behavioral: Negative for confusion.       Physical Exam     Initial Vitals [12/09/17 0625]   BP Pulse Resp Temp SpO2   (!) 144/70 88 18 98.7 °F (37.1 °C) 98 %      MAP       94.67         Physical Exam    Nursing note and vitals reviewed.  Constitutional: He appears well-developed and well-nourished. He is not diaphoretic. No distress.   HENT:   Head: Normocephalic and atraumatic.   Mouth/Throat: Oropharynx is clear and moist.   Eyes: Conjunctivae and EOM are normal. Pupils are equal, round, and reactive to light.   Neck: Normal range of motion. Neck supple.   Cardiovascular: Normal rate, regular rhythm, S1 normal, S2 normal and normal heart sounds. Exam reveals no gallop and no friction rub.    No murmur heard.  Pulmonary/Chest: Breath sounds normal. No respiratory distress. He has no wheezes. He has no rhonchi. He has no rales.   Abdominal: Soft. Bowel sounds are normal. There is tenderness (RLQ). There is no rebound and no guarding.   Musculoskeletal: Normal range of motion. He exhibits no edema or tenderness.   No lower extremity edema.    Lymphadenopathy:     He has no cervical adenopathy.   Neurological: He is alert and oriented to person, place, and time.   Skin: Skin is warm and dry. Capillary refill takes less than 2 seconds. No rash noted. No pallor.   Psychiatric: He has a normal mood and affect. His behavior is normal. Judgment and thought content normal.         ED Course   Procedures  Labs Reviewed   CBC W/ AUTO DIFFERENTIAL - Abnormal; Notable for the following:        Result Value    RBC 4.26 (*)     Hemoglobin 13.0 (*)     Hematocrit 38.4 (*)     RDW 15.4 (*)     Gran # 8.8 (*)     Lymph # 0.8 (*)     Gran% 88.6 (*)     Lymph% 8.4 (*)     Mono% 2.7 (*)     All other components within normal limits   COMPREHENSIVE METABOLIC PANEL -  Abnormal; Notable for the following:     Glucose 145 (*)     All other components within normal limits   LIPASE   MAGNESIUM     Imaging Results          CT Renal Stone Study ABD Pelvis WO (Final result)  Result time 12/09/17 08:35:38    Final result by Lior Nash MD (12/09/17 08:35:38)                 Impression:     No acute findings.      Electronically signed by: LIOR NASH MD  Date:     12/09/17  Time:    08:35              Narrative:    CT renal stone study.    Findings: The visualized portion of the base of the lungs is significant for fat-containing left-sided Bochdalek hernia. The visualized portion of the heart, stomach, spleen, pancreas, liver, gallbladder, adrenal glands, visualized portion of the aorta, and right kidney are unremarkable. The left kidney contains a subcentimeter hypodensity too small to characterize. There is no hydronephrosis or nephrolithiasis. The bladder is unremarkable. The bowel is unremarkable. The appendix is not visualized however there is no significant inflammatory change in the right lower quadrant adjacent to the cecum. The osseous structures demonstrate mild degenerative change.                                      Medical Decision Making:   Clinical Tests:   Lab Tests: Reviewed and Ordered  Radiological Study: Ordered and Reviewed  ED Management:  10:10 AM - Patient able to tolerate PO intake without complication. He states he is feeling much better and would like to go home.            Scribe Attestation:   Scribe #1: I performed the above scribed service and the documentation accurately describes the services I performed. I attest to the accuracy of the note.    Attending Attestation:           Physician Attestation for Scribe:  Physician Attestation Statement for Scribe #1: I, Dr. Emanuel, reviewed documentation, as scribed by Nico Khan in my presence, and it is both accurate and complete.         Attending ED Notes:   Emergent evaluation a 57-year-old male  with complaint of nausea, vomiting, diarrhea with associated intermittent abdominal pain and intermittent headache.  Patient is currently headache free.  Patient is afebrile, nontoxic-appearing with stable vital signs.  Patient no vastly intact without focal neurologic deficits.  No elevation of white blood cell count.  H&H is 13 and 38.4.  No acute finding on CMP.  Lipase is within normal limits.  CT scan revealed no acute findings.  No inflammatory changes in the right lower quadrant.  On repeat physical exam abdominal exam is benign.  Patient states he feels much better, is tolerating by mouth intake without complication and is requesting to go home.  The patient is extensive the counseled on his diagnosis and treatment including all diagnostic, laboratory and physical exam findings.  The patient discharged good condition and directed follow-up his PCP in the next 24-48 hours.          ED Course      Clinical Impression:     1. Nausea vomiting and diarrhea    2. Anemia, unspecified type                                 Kyrie Kiser MD  12/09/17 3050

## 2017-12-09 NOTE — ED TRIAGE NOTES
"Pt presents to the ED with c/o N/V/D starting last night at 2200. Pt states he ate oysters yesterday and believes they were bad, no one else in the household had them. Pt states diarrhea is dark and liquid. Pt reports abdominal pain all, generalized, rated 8/10, described as "cramping". Denies previous hx.   "

## 2017-12-09 NOTE — ED NOTES
"Pt sitting up in stretcher talking with family member at bedside.Respriations are spontaneous, even and non labored w/ no distress noted. Pt reports + constant generalized abdominal pains described as "cramping" rated 10/10 on pain scale.  Pt remains on continuous pulse oximetry and automatic blood pressure cuff cycling w/ alarms set. Bed placed in low locked position, side rails up x 2, call light is within reach of patient or family, alarms set and turned on for monitor and pulse ox, will continue to monitor.     "

## 2017-12-09 NOTE — ED NOTES
Pt given ice chips and water for PO challenge. Tolerating well. States nausea and pain have completely resolved since receiving Zofran.

## 2017-12-09 NOTE — ED NOTES
Pt reports that his pain is better, but that when he began feeling nauseous he began having stomach discomfort again.

## 2017-12-09 NOTE — ED NOTES
Pt reports feeling nauseous after receiving the morphine.  aware. New order for Zofran reced.

## 2021-11-18 ENCOUNTER — HOSPITAL ENCOUNTER (OUTPATIENT)
Dept: PREADMISSION TESTING | Facility: OTHER | Age: 61
Discharge: HOME | End: 2021-11-18
Attending: ORTHOPAEDIC SURGERY
Payer: MEDICARE

## 2021-11-18 VITALS
BODY MASS INDEX: 25.34 KG/M2 | TEMPERATURE: 98 F | SYSTOLIC BLOOD PRESSURE: 125 MMHG | OXYGEN SATURATION: 99 % | HEART RATE: 60 BPM | HEIGHT: 70 IN | WEIGHT: 177 LBS | DIASTOLIC BLOOD PRESSURE: 79 MMHG | RESPIRATION RATE: 18 BRPM

## 2021-11-18 DIAGNOSIS — Z01.818 PREOP TESTING: Primary | ICD-10-CM

## 2021-11-18 LAB
BASOPHILS # BLD AUTO: 0.04 K/UL (ref 0–0.2)
BASOPHILS NFR BLD: 0.6 % (ref 0–1.9)
BILIRUB UR QL STRIP: NEGATIVE
CLARITY UR: CLEAR
COLOR UR: YELLOW
DIFFERENTIAL METHOD: ABNORMAL
EOSINOPHIL # BLD AUTO: 0.1 K/UL (ref 0–0.5)
EOSINOPHIL NFR BLD: 0.7 % (ref 0–8)
ERYTHROCYTE [DISTWIDTH] IN BLOOD BY AUTOMATED COUNT: 15.1 % (ref 11.5–14.5)
GLUCOSE UR QL STRIP: NEGATIVE
HCT VFR BLD AUTO: 36.8 % (ref 40–54)
HGB BLD-MCNC: 12 G/DL (ref 14–18)
HGB UR QL STRIP: NEGATIVE
IMM GRANULOCYTES # BLD AUTO: 0.01 K/UL (ref 0–0.04)
IMM GRANULOCYTES NFR BLD AUTO: 0.1 % (ref 0–0.5)
KETONES UR QL STRIP: NEGATIVE
LEUKOCYTE ESTERASE UR QL STRIP: NEGATIVE
LYMPHOCYTES # BLD AUTO: 2.8 K/UL (ref 1–4.8)
LYMPHOCYTES NFR BLD: 39.8 % (ref 18–48)
MCH RBC QN AUTO: 30.1 PG (ref 27–31)
MCHC RBC AUTO-ENTMCNC: 32.6 G/DL (ref 32–36)
MCV RBC AUTO: 92 FL (ref 82–98)
MONOCYTES # BLD AUTO: 0.4 K/UL (ref 0.3–1)
MONOCYTES NFR BLD: 5.9 % (ref 4–15)
NEUTROPHILS # BLD AUTO: 3.7 K/UL (ref 1.8–7.7)
NEUTROPHILS NFR BLD: 52.9 % (ref 38–73)
NITRITE UR QL STRIP: NEGATIVE
NRBC BLD-RTO: 0 /100 WBC
PH UR STRIP: 6 [PH] (ref 5–8)
PLATELET # BLD AUTO: 309 K/UL (ref 150–450)
PMV BLD AUTO: 10.6 FL (ref 9.2–12.9)
PROT UR QL STRIP: NEGATIVE
RBC # BLD AUTO: 3.99 M/UL (ref 4.6–6.2)
SP GR UR STRIP: 1.01 (ref 1–1.03)
URN SPEC COLLECT METH UR: NORMAL
UROBILINOGEN UR STRIP-ACNC: NEGATIVE EU/DL
WBC # BLD AUTO: 6.94 K/UL (ref 3.9–12.7)

## 2021-11-18 PROCEDURE — 85025 COMPLETE CBC W/AUTO DIFF WBC: CPT | Performed by: ORTHOPAEDIC SURGERY

## 2021-11-18 PROCEDURE — 36415 COLL VENOUS BLD VENIPUNCTURE: CPT | Performed by: ORTHOPAEDIC SURGERY

## 2021-11-18 PROCEDURE — 81003 URINALYSIS AUTO W/O SCOPE: CPT | Performed by: ORTHOPAEDIC SURGERY

## 2021-11-18 RX ORDER — SODIUM CHLORIDE, SODIUM LACTATE, POTASSIUM CHLORIDE, CALCIUM CHLORIDE 600; 310; 30; 20 MG/100ML; MG/100ML; MG/100ML; MG/100ML
INJECTION, SOLUTION INTRAVENOUS CONTINUOUS
Status: CANCELLED | OUTPATIENT
Start: 2021-11-18

## 2021-11-18 RX ORDER — ACETAMINOPHEN 500 MG
1000 TABLET ORAL
Status: CANCELLED | OUTPATIENT
Start: 2021-11-18 | End: 2021-11-18

## 2021-11-18 RX ORDER — LIDOCAINE HYDROCHLORIDE 10 MG/ML
0.5 INJECTION, SOLUTION EPIDURAL; INFILTRATION; INTRACAUDAL; PERINEURAL ONCE
Status: CANCELLED | OUTPATIENT
Start: 2021-11-18 | End: 2021-11-18

## 2021-11-18 RX ORDER — FAMOTIDINE 20 MG/1
20 TABLET, FILM COATED ORAL
Status: CANCELLED | OUTPATIENT
Start: 2021-11-18 | End: 2021-11-18

## 2021-12-28 ENCOUNTER — DOCUMENT SCAN (OUTPATIENT)
Dept: HOME HEALTH SERVICES | Facility: HOSPITAL | Age: 61
End: 2021-12-28
Payer: MEDICARE

## 2025-06-12 DIAGNOSIS — M79.89 SWELLING OF RIGHT HAND: Primary | ICD-10-CM

## 2025-06-13 ENCOUNTER — OFFICE VISIT (OUTPATIENT)
Dept: ORTHOPEDICS | Facility: CLINIC | Age: 65
End: 2025-06-13
Payer: MEDICARE

## 2025-06-13 ENCOUNTER — HOSPITAL ENCOUNTER (OUTPATIENT)
Dept: RADIOLOGY | Facility: OTHER | Age: 65
Discharge: HOME | End: 2025-06-13
Attending: SURGERY
Payer: MEDICARE

## 2025-06-13 VITALS — SYSTOLIC BLOOD PRESSURE: 144 MMHG | HEART RATE: 56 BPM | DIASTOLIC BLOOD PRESSURE: 88 MMHG

## 2025-06-13 DIAGNOSIS — M79.89 SWELLING OF RIGHT HAND: ICD-10-CM

## 2025-06-13 DIAGNOSIS — G90.511 COMPLEX REGIONAL PAIN SYNDROME TYPE 1 OF RIGHT UPPER EXTREMITY: Primary | ICD-10-CM

## 2025-06-13 PROCEDURE — 73130 X-RAY EXAM OF HAND: CPT | Mod: 26,RT,, | Performed by: RADIOLOGY

## 2025-06-13 PROCEDURE — 3079F DIAST BP 80-89 MM HG: CPT | Mod: CPTII,S$GLB,, | Performed by: SURGERY

## 2025-06-13 PROCEDURE — 73130 X-RAY EXAM OF HAND: CPT | Mod: TC,FY,RT

## 2025-06-13 PROCEDURE — 99203 OFFICE O/P NEW LOW 30 MIN: CPT | Mod: S$GLB,,, | Performed by: SURGERY

## 2025-06-13 PROCEDURE — 99999 PR PBB SHADOW E&M-EST. PATIENT-LVL III: CPT | Mod: PBBFAC,,, | Performed by: SURGERY

## 2025-06-13 PROCEDURE — 1125F AMNT PAIN NOTED PAIN PRSNT: CPT | Mod: CPTII,S$GLB,, | Performed by: SURGERY

## 2025-06-13 PROCEDURE — 3077F SYST BP >= 140 MM HG: CPT | Mod: CPTII,S$GLB,, | Performed by: SURGERY

## 2025-06-13 PROCEDURE — 1160F RVW MEDS BY RX/DR IN RCRD: CPT | Mod: CPTII,S$GLB,, | Performed by: SURGERY

## 2025-06-13 PROCEDURE — 1159F MED LIST DOCD IN RCRD: CPT | Mod: CPTII,S$GLB,, | Performed by: SURGERY

## 2025-06-13 RX ORDER — PREGABALIN 75 MG/1
75 CAPSULE ORAL 2 TIMES DAILY
Qty: 60 CAPSULE | Refills: 6 | Status: SHIPPED | OUTPATIENT
Start: 2025-06-13 | End: 2026-01-09
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
## 2025-06-24 ENCOUNTER — CLINICAL SUPPORT (OUTPATIENT)
Dept: REHABILITATION | Facility: HOSPITAL | Age: 65
End: 2025-06-24
Attending: SURGERY
Payer: MEDICARE

## 2025-06-24 DIAGNOSIS — M25.641 STIFFNESS OF FINGER JOINT OF RIGHT HAND: ICD-10-CM

## 2025-06-24 DIAGNOSIS — M79.641 PAIN IN RIGHT HAND: Primary | ICD-10-CM

## 2025-06-24 PROCEDURE — 97166 OT EVAL MOD COMPLEX 45 MIN: CPT

## 2025-06-24 PROCEDURE — 97110 THERAPEUTIC EXERCISES: CPT

## 2025-06-24 NOTE — PATIENT INSTRUCTIONS
"[facility], OCCUPATIONAL THERAPY  HOME EXERCISE PROGRAM     Touch the hand with various textures; vibration.   Look at the hand and move it with the L hand at the same time.    Complete the following two massages for 5 minutes, 2x/day:                                                       Complete the following exercises for 10 repetitions, 3-4x/day:     AROM: Elbow Flexion / Extension        Bend and straighten elbow in 3 different positions:   thumb up, palm up, palm down.      AROM: Supination / Pronation   With your elbow by your side, turn your   palm up then turn your palm down.      AROM: Wrist Flexion / Extension               Bend your wrist forward and back as far as possible.          AROM: Wrist Radial / Ulnar Deviation  Bend your wrist from side to side as far as possible.  AROM: Isolated MCP Flexion / Extension ("Wave")   Bend only your large, bottom knuckles. Hold 3 seconds.   Keep the tips of your fingers straight. Straighten fingers.      AROM: Isolated IPJ Flexion / Extension ("Hook")   Bend only your middle and end knuckles. Hold 3 seconds.   Straighten your fingers.       AROM: MCP and PIP Flexion / Extension ("Straight Fist")  Bend your bottom and middle knuckles, keeping the tips of your fingers straight.   Try to touch the pads of your fingers on your palm. Hold 3 seconds.   Straighten your fingers.       AROM: Composite Flexion / Extension ("Full Fist")  Bend every joint in your hand into a fist. Hold 3 seconds.   Straighten your fingers.         AROM: Composte Extension ("Finger Lifts")  Lift your finger off of the table one at a time. Hold 3 seconds.   Relax your finger.      AROM: Abduction / Adduction  With hand flat on table, spread all fingers apart,   then bring them together as close as possible.  AROM: Composite Flexion   Bend both joints of thumb as far as possible.   Try to touch base of little finger.      AROM: Opposition   Touch tip of thumb to nail tip of " "each  finger in turn, making an "O" shape.             Therapist: CHRISTIAN Gabriel CHT       "

## 2025-06-25 ENCOUNTER — TELEPHONE (OUTPATIENT)
Dept: PLASTIC SURGERY | Facility: CLINIC | Age: 65
End: 2025-06-25
Payer: MEDICAID

## 2025-06-25 DIAGNOSIS — G90.511 COMPLEX REGIONAL PAIN SYNDROME TYPE 1 OF RIGHT UPPER EXTREMITY: ICD-10-CM

## 2025-06-25 DIAGNOSIS — M79.89 SWELLING OF RIGHT HAND: Primary | ICD-10-CM

## 2025-06-30 ENCOUNTER — TELEPHONE (OUTPATIENT)
Dept: PAIN MEDICINE | Facility: CLINIC | Age: 65
End: 2025-06-30
Payer: MEDICAID

## 2025-06-30 NOTE — TELEPHONE ENCOUNTER
Left voicemail, asking patient to call [facility] to schedule appointment. Encouraged using assistance from Call Center to schedule. Call ended.

## 2025-07-07 PROBLEM — M79.641 PAIN IN RIGHT HAND: Status: ACTIVE | Noted: 2025-07-07

## 2025-07-07 PROBLEM — M25.641 STIFFNESS OF FINGER JOINT OF RIGHT HAND: Status: ACTIVE | Noted: 2025-07-07
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
## 2025-07-17 ENCOUNTER — CLINICAL SUPPORT (OUTPATIENT)
Dept: REHABILITATION | Facility: HOSPITAL | Age: 65
End: 2025-07-17
Payer: MEDICARE

## 2025-07-17 DIAGNOSIS — M25.641 STIFFNESS OF FINGER JOINT OF RIGHT HAND: Primary | ICD-10-CM

## 2025-07-17 PROCEDURE — 97112 NEUROMUSCULAR REEDUCATION: CPT

## 2025-07-17 PROCEDURE — 97018 PARAFFIN BATH THERAPY: CPT

## 2025-07-17 PROCEDURE — 97110 THERAPEUTIC EXERCISES: CPT

## 2025-07-21 ENCOUNTER — CLINICAL SUPPORT (OUTPATIENT)
Dept: REHABILITATION | Facility: HOSPITAL | Age: 65
End: 2025-07-21
Payer: MEDICARE

## 2025-07-21 DIAGNOSIS — M25.641 STIFFNESS OF FINGER JOINT OF RIGHT HAND: Primary | ICD-10-CM

## 2025-07-21 PROCEDURE — 97018 PARAFFIN BATH THERAPY: CPT

## 2025-07-21 PROCEDURE — 97110 THERAPEUTIC EXERCISES: CPT

## 2025-07-21 PROCEDURE — 97140 MANUAL THERAPY 1/> REGIONS: CPT

## 2025-07-21 NOTE — PROGRESS NOTES
Outpatient Rehab    Occupational Therapy Visit    Patient Name: Varun Miller Jr.  MRN: [id number]  YOB: 1960  Encounter Date: 7/17/2025    Therapy Diagnosis:   Encounter Diagnosis   Name Primary?    Stiffness of finger joint of right hand Yes     Physician: Cherise Cooley MD    Physician Orders: Eval and Treat  Medical Diagnosis: Complex regional pain syndrome type 1 of right upper extremity  Surgical Diagnosis: R LF AI and R CTR   Surgical Date: 3/29/2025  Days Since Last Surgery: 114    Visit # / Visits Authorized: 2 / 20  Insurance Authorization Period: 6/24/2025 to 12/31/2025  Date of Evaluation: 6/24/2025  Plan of Care Certification: 6/24/2025 to 9/30/2025      Time In: 0930   Time Out: 1015  Total Time (in minutes): 45   Total Billable Time (in minutes): 45    FOTO:  Intake Score (%): Not applicable for this Episode  Survey Score 2 (%): Not applicable for this Episode  Survey Score 3 (%): Not applicable for this Episode    Precautions:       Subjective   Pt. reports he wasn't sure if his insurance had approved so he had missed some sessions. He reports he is having a lot of pain in the arm all the way from the neck to the hand..  Pain reported as 7/10.      Objective      Digit 2 - Index Finger Active Range of Motion  Right Index Finger   Extension (deg) Flexion (deg) Pain   MCP -25 (+5) 60  (+20)   PIP -25 (=) 75  (+25)   DIP 0 15  (+10)   CHIN: 100      Digit 3 - Middle Finger Active Range of Motion  Right Middle Finger   Extension (deg) Flexion (deg) Pain   MCP -25 (=) 60  (+20)   PIP -25 (=) 85  (+20)   DIP 0 45  (+25)   CHIN: 140      Digit 4 - Ring Finger Active Range of Motion  Right Ring Finger   Extension (deg) Flexion (deg) Pain   MCP -25 (-7) 55  (+15)   PIP -25 (=) 75  (+15)   DIP 0 25  (+7)   CHIN: 105      Digit 5 - Little Finger Active Range of Motion  Right Little Finger   Extension (deg) Flexion (deg) Pain   MCP 0 (+6) 60  (+10)   PIP -30 (=) 75  (+30)   DIP 0        CHIN:                           Treatment:  Therapeutic Exercise  TE 1: IP blocking,  TE 2: wave, hook, flat fist  TE 3: digit isolated extension  TE 4: digit abd/add  TE 5: towel glides  TE 6: passive ROM  Manual Therapy  MT 1: use of iastm tissue movers to hand and forearm  Balance/Neuromuscular Re-Education  NMR 1: use of vibration ball to UE during heat x 10 min  NMR 2: application of KT to help decrease neural tension along neck, shoulder, FA and hand  Modalities  Paraffin Bath: Patient received paraffin bath for to increase blood flow, circulation, pain management and for tissue elasticity prior to therex.    Time Entry(in minutes):  Paraffin Bath Time Entry: 10  Manual Therapy Time Entry: 10  Neuromuscular Re-Education Time Entry: 15  Therapeutic Exercise Time Entry: 15    Assessment & Plan   Assessment: Pt. Continues with pain and decreased functional use. Pt. Reports increase in motion after application of KT. Feels tightness and pulling from neck to finger tips. Attempted to send pt to [facility] to get scheduled per his chart, however, they stated they did not have an order. Will f/u.   Evaluation/Treatment Tolerance: Patient limited by pain    The patient will continue to benefit from skilled outpatient occupational therapy in order to address the deficits listed in the problem list on the initial evaluation, provide patient and family education, and maximize the patients level of independence in the home and community environments.     The patient's spiritual, cultural, and educational needs were considered, and the patient is agreeable to the plan of care and goals.           Plan: Cont per OT POC with focus on return to functional independence    Goals:   Active       LTGs       1) Decrease pain in R to no more than 2/10 worst in ADL/IADL's        Start:  07/08/25    Expected End:  09/30/25            2) Increase AROM of PIPj to 90 for increased functioning in ADL/IADL's         Start:  07/08/25     Expected End:  09/30/25            3) Assess  and pinch strength when appropriate        Start:  07/08/25    Expected End:  09/30/25            4) Increase AROM of R hand to achieve a functional composite fist for increased functioning in ADL/IADL's         Start:  07/08/25    Expected End:  09/30/25               STGs       1) Patient will be independent in HEP        Start:  07/08/25    Expected End:  08/19/25            2) Decrease pain in R hand to no more than 4/10 worst in ADL/IADL's        Start:  07/08/25    Expected End:  08/19/25            3) Increase AROM in R hand at MP joints to 80* for improved functioning in ADL/IADL's        Start:  07/08/25    Expected End:  08/19/25            4) Increase AROM in digit extension to neutral for improved functioning in ADL/IADL's        Start:  07/08/25    Expected End:  08/19/25                Melissa Cain, OTR/L,CHT

## 2025-07-28 ENCOUNTER — CLINICAL SUPPORT (OUTPATIENT)
Dept: REHABILITATION | Facility: HOSPITAL | Age: 65
End: 2025-07-28
Payer: MEDICARE

## 2025-07-28 DIAGNOSIS — M25.641 STIFFNESS OF FINGER JOINT OF RIGHT HAND: Primary | ICD-10-CM

## 2025-07-28 PROCEDURE — 97110 THERAPEUTIC EXERCISES: CPT

## 2025-07-28 PROCEDURE — 97018 PARAFFIN BATH THERAPY: CPT

## 2025-07-28 PROCEDURE — 97140 MANUAL THERAPY 1/> REGIONS: CPT

## 2025-07-31 ENCOUNTER — CLINICAL SUPPORT (OUTPATIENT)
Dept: REHABILITATION | Facility: HOSPITAL | Age: 65
End: 2025-07-31
Payer: MEDICARE

## 2025-07-31 DIAGNOSIS — M25.641 STIFFNESS OF FINGER JOINT OF RIGHT HAND: Primary | ICD-10-CM

## 2025-07-31 PROCEDURE — 97110 THERAPEUTIC EXERCISES: CPT

## 2025-07-31 PROCEDURE — 97018 PARAFFIN BATH THERAPY: CPT

## 2025-07-31 PROCEDURE — 97140 MANUAL THERAPY 1/> REGIONS: CPT

## 2025-08-06 ENCOUNTER — CLINICAL SUPPORT (OUTPATIENT)
Dept: REHABILITATION | Facility: HOSPITAL | Age: 65
End: 2025-08-06
Payer: MEDICARE

## 2025-08-06 DIAGNOSIS — M25.641 STIFFNESS OF FINGER JOINT OF RIGHT HAND: Primary | ICD-10-CM

## 2025-08-06 PROCEDURE — 97140 MANUAL THERAPY 1/> REGIONS: CPT | Performed by: OCCUPATIONAL THERAPIST

## 2025-08-06 PROCEDURE — 97110 THERAPEUTIC EXERCISES: CPT | Performed by: OCCUPATIONAL THERAPIST

## 2025-08-06 PROCEDURE — 97530 THERAPEUTIC ACTIVITIES: CPT | Performed by: OCCUPATIONAL THERAPIST

## 2025-08-06 NOTE — PROGRESS NOTES
Outpatient Rehab    Occupational Therapy Visit    Patient Name: Varun Miller Jr.  MRN: [id number]  YOB: 1960  Encounter Date: 8/6/2025    Therapy Diagnosis:   Encounter Diagnosis   Name Primary?    Stiffness of finger joint of right hand Yes     Physician: Cherise Cooley MD    Physician Orders: Eval and Treat  Medical Diagnosis: Complex regional pain syndrome type 1 of right upper extremity  Surgical Diagnosis: R LF AI and R CTR   Surgical Date: 3/29/2025  Days Since Last Surgery: 130    Visit # / Visits Authorized: 5 / 20  Insurance Authorization Period: 6/24/2025 to 12/31/2025  Date of Evaluation: 6/24/2025  Plan of Care Certification: 6/24/2025 to 9/30/2025      Time In: 1055   Time Out: 1140  Total Time (in minutes): 45   Total Billable Time (in minutes):      FOTO:  Intake Score (%): Not applicable for this Episode  Survey Score 2 (%): Not applicable for this Episode  Survey Score 3 (%): Not applicable for this Episode    Precautions:       Subjective   Pt c/o pain and stiffness in hand. He states the pain goes up to his shoulder.  Pain reported as 8/10.      Objective            Treatment:  Therapeutic Exercise  TE 1: IP blocking,  TE 2: wave, hook, flat fist with foam  TE 3: digit isolated extension  TE 4: digit abd/add  TE 5: attempted towel slides on table for shoulder flexion  TE 6: shoulder blade squeezes  TE 7: wrist 3 ways with dowel x 10  Therapeutic Activity  TA 1: pink sponge squeezes  TA 2: wrist wheel ext/flex and sup/pro partial range  TA 3: yellow therputty pancake press and dowel press  TA 4: yellow digi extender x 10  Manual Therapy  MT 1: use of iastm tissue movers to hand and forearm    Time Entry(in minutes):  Manual Therapy Time Entry: 10  Therapeutic Activity Time Entry: 15  Therapeutic Exercise Time Entry: 20    Assessment & Plan   Assessment: Pt had difficulty tolerating all treatment tasks due to pain in shoulder.        The patient will continue to benefit from  skilled outpatient occupational therapy in order to address the deficits listed in the problem list on the initial evaluation, provide patient and family education, and maximize the patients level of independence in the home and community environments.     The patient's spiritual, cultural, and educational needs were considered, and the patient is agreeable to the plan of care and goals.           Plan: Cont per OT POC with focus on return to functional independence    Goals:   Active       LTGs       1) Decrease pain in R to no more than 2/10 worst in ADL/IADL's        Start:  07/08/25    Expected End:  09/30/25            2) Increase AROM of PIPj to 90 for increased functioning in ADL/IADL's         Start:  07/08/25    Expected End:  09/30/25            3) Assess  and pinch strength when appropriate        Start:  07/08/25    Expected End:  09/30/25            4) Increase AROM of R hand to achieve a functional composite fist for increased functioning in ADL/IADL's         Start:  07/08/25    Expected End:  09/30/25               STGs       1) Patient will be independent in HEP        Start:  07/08/25    Expected End:  08/19/25            2) Decrease pain in R hand to no more than 4/10 worst in ADL/IADL's        Start:  07/08/25    Expected End:  08/19/25            3) Increase AROM in R hand at MP joints to 80* for improved functioning in ADL/IADL's        Start:  07/08/25    Expected End:  08/19/25            4) Increase AROM in digit extension to neutral for improved functioning in ADL/IADL's        Start:  07/08/25    Expected End:  08/19/25                Margaret Boasberg, OT

## 2025-08-12 ENCOUNTER — CLINICAL SUPPORT (OUTPATIENT)
Dept: REHABILITATION | Facility: HOSPITAL | Age: 65
End: 2025-08-12
Payer: MEDICARE

## 2025-08-12 DIAGNOSIS — M79.89 SWELLING OF RIGHT HAND: Primary | ICD-10-CM

## 2025-08-12 DIAGNOSIS — M25.641 STIFFNESS OF FINGER JOINT OF RIGHT HAND: Primary | ICD-10-CM

## 2025-08-12 DIAGNOSIS — G90.511 COMPLEX REGIONAL PAIN SYNDROME TYPE 1 OF RIGHT UPPER EXTREMITY: ICD-10-CM

## 2025-08-12 PROCEDURE — 97018 PARAFFIN BATH THERAPY: CPT

## 2025-08-12 PROCEDURE — 97140 MANUAL THERAPY 1/> REGIONS: CPT

## 2025-08-12 PROCEDURE — 97110 THERAPEUTIC EXERCISES: CPT

## 2025-08-13 ENCOUNTER — CLINICAL SUPPORT (OUTPATIENT)
Dept: REHABILITATION | Facility: OTHER | Age: 65
End: 2025-08-13
Attending: SURGERY
Payer: MEDICARE

## 2025-08-13 DIAGNOSIS — G89.4 CHRONIC PAIN SYNDROME: ICD-10-CM

## 2025-08-13 DIAGNOSIS — M79.89 SWELLING OF RIGHT HAND: ICD-10-CM

## 2025-08-13 DIAGNOSIS — G90.511 COMPLEX REGIONAL PAIN SYNDROME TYPE 1 OF RIGHT UPPER EXTREMITY: Primary | ICD-10-CM

## 2025-08-13 PROCEDURE — 97163 PT EVAL HIGH COMPLEX 45 MIN: CPT

## 2025-08-13 PROCEDURE — 97140 MANUAL THERAPY 1/> REGIONS: CPT

## 2025-08-18 ENCOUNTER — CLINICAL SUPPORT (OUTPATIENT)
Dept: REHABILITATION | Facility: OTHER | Age: 65
End: 2025-08-18
Payer: MEDICARE

## 2025-08-18 DIAGNOSIS — G89.4 CHRONIC PAIN SYNDROME: ICD-10-CM

## 2025-08-18 DIAGNOSIS — M79.89 SWELLING OF RIGHT HAND: ICD-10-CM

## 2025-08-18 DIAGNOSIS — G90.511 COMPLEX REGIONAL PAIN SYNDROME TYPE 1 OF RIGHT UPPER EXTREMITY: Primary | ICD-10-CM

## 2025-08-18 PROCEDURE — 97140 MANUAL THERAPY 1/> REGIONS: CPT

## 2025-08-18 PROCEDURE — 97112 NEUROMUSCULAR REEDUCATION: CPT

## 2025-08-19 ENCOUNTER — CLINICAL SUPPORT (OUTPATIENT)
Dept: REHABILITATION | Facility: HOSPITAL | Age: 65
End: 2025-08-19
Payer: MEDICARE

## 2025-08-19 DIAGNOSIS — M25.641 STIFFNESS OF FINGER JOINT OF RIGHT HAND: Primary | ICD-10-CM

## 2025-08-19 PROCEDURE — 97110 THERAPEUTIC EXERCISES: CPT

## 2025-08-19 PROCEDURE — 97140 MANUAL THERAPY 1/> REGIONS: CPT

## 2025-08-19 PROCEDURE — 97018 PARAFFIN BATH THERAPY: CPT

## 2025-09-03 ENCOUNTER — CLINICAL SUPPORT (OUTPATIENT)
Dept: REHABILITATION | Facility: OTHER | Age: 65
End: 2025-09-03
Payer: MEDICARE

## 2025-09-03 DIAGNOSIS — G90.511 COMPLEX REGIONAL PAIN SYNDROME TYPE 1 OF RIGHT UPPER EXTREMITY: Primary | ICD-10-CM

## 2025-09-03 DIAGNOSIS — M79.89 SWELLING OF RIGHT HAND: ICD-10-CM

## 2025-09-03 DIAGNOSIS — G89.4 CHRONIC PAIN SYNDROME: ICD-10-CM

## 2025-09-03 PROCEDURE — 97112 NEUROMUSCULAR REEDUCATION: CPT

## 2025-09-03 PROCEDURE — 97140 MANUAL THERAPY 1/> REGIONS: CPT

## (undated) DEVICE — GLOVE BIOGEL SKINSENSE PI 7.5

## (undated) DEVICE — SEE MEDLINE ITEM 152678

## (undated) DEVICE — NDL SPINAL 18GX3.5 SPINOCAN

## (undated) DEVICE — SYS LABEL CORRECT MED

## (undated) DEVICE — SYR 10CC LUER LOCK

## (undated) DEVICE — SYR DISP LL 5CC

## (undated) DEVICE — NDL HYPODERMIC BLUNT 18G 1.5IN

## (undated) DEVICE — SEE MEDLINE ITEM 146292